# Patient Record
(demographics unavailable — no encounter records)

---

## 2025-03-19 NOTE — HISTORY OF PRESENT ILLNESS
[de-identified] : 19-year-old male with 1 week left knee pain.  He developed pain and swelling a few days after intense basketball.  He plays recreational basketball at [employer].  He reports swelling and stiffness.  He has pain along the medial aspect with clicking and locking.  He feels instability to the knee.  He has pain with walking and exertional activity.  Somewhat improved with rest.  He has not had treatment so far.

## 2025-03-19 NOTE — PHYSICAL EXAM
[Normal] : Gait: normal [de-identified] : General: No acute distress Mental: Alert and oriented x3 Eyes: Conjunctivitis not seen Chest: Symmetric chest rise, no audible wheezing Skin: Bilateral lower extremities absent from rashes and ulcers Abdomen: No distention  Left knee: Skin: Clean, dry, intact Inspection: No obvious malalignment, no masses, no swelling, small effusion. Tenderness: Positive MJLT. no LJLT. No tenderness over the medial and lateral patella facets. No tenderness medial/lateral epicondyle, patella tendon, tibial tubercle, pes anserinus, or proximal fibula. ROM: 0 to 130 Stability: Stable to varus and valgus, negative lachman. Negative anterior/posterior drawer. Additional tests: Positive McMurrays test, positive Steinmann, Negative patellar grind test, positive Thessaly, positive Apley compression test Strength: 5/5 Q/H/TA/GS/EHL, no atrophy Neuro: Sensation intact to light touch throughout in dp/sp/tib/alvaro/saph nerve distributions Pulses: 2+ DP/PT pulses. [de-identified] : Left knee x-ray 3 views shows neutral alignment, maintained joint spaces, patella at appropriate height and central position, no fracture.

## 2025-03-19 NOTE — DISCUSSION/SUMMARY
[de-identified] : 19-year-old male with acute left knee pain and swelling and locking symptoms.  There is concern for meniscus tear.  I recommended MRI of the left knee to further evaluate intra-articular pathology with possible bucket-handle meniscus tear.  I recommended rest, ice, elevation, and stretching exercises.  He may continue with activities as tolerated.  He will be contacted with MRI results.